# Patient Record
Sex: FEMALE | Race: WHITE | NOT HISPANIC OR LATINO | URBAN - METROPOLITAN AREA
[De-identification: names, ages, dates, MRNs, and addresses within clinical notes are randomized per-mention and may not be internally consistent; named-entity substitution may affect disease eponyms.]

---

## 2023-10-18 ENCOUNTER — TELEPHONE (OUTPATIENT)
Dept: PRIMARY CARE | Facility: CLINIC | Age: 68
End: 2023-10-18
Payer: MEDICARE

## 2023-10-18 DIAGNOSIS — I10 BENIGN ESSENTIAL HYPERTENSION: Primary | ICD-10-CM

## 2023-10-18 PROBLEM — E78.2 MIXED HYPERLIPIDEMIA: Status: ACTIVE | Noted: 2023-10-18

## 2023-10-18 PROBLEM — F51.01 PRIMARY INSOMNIA: Status: ACTIVE | Noted: 2022-03-23

## 2023-10-18 PROBLEM — G47.33 OBSTRUCTIVE SLEEP APNEA SYNDROME: Status: ACTIVE | Noted: 2023-10-18

## 2023-10-18 PROBLEM — G43.109 MIGRAINE WITH AURA AND WITHOUT STATUS MIGRAINOSUS, NOT INTRACTABLE: Status: ACTIVE | Noted: 2023-10-18

## 2023-10-18 PROBLEM — M85.80 OSTEOPENIA: Status: ACTIVE | Noted: 2018-02-18

## 2023-10-18 PROBLEM — D12.6 TUBULAR ADENOMA OF COLON: Status: ACTIVE | Noted: 2023-10-18

## 2023-10-18 PROBLEM — R73.03 PREDIABETES: Status: ACTIVE | Noted: 2018-02-10

## 2023-10-18 PROBLEM — M17.0 PRIMARY OSTEOARTHRITIS OF BOTH KNEES: Status: ACTIVE | Noted: 2018-08-03

## 2023-10-18 RX ORDER — ACETAMINOPHEN AND DIPHENHYDRAMINE HYDROCHLORIDE 500; 25 MG/1; MG/1
1 TABLET, FILM COATED ORAL NIGHTLY PRN
COMMUNITY
Start: 2023-10-04

## 2023-10-18 RX ORDER — ATORVASTATIN CALCIUM 20 MG/1
1 TABLET, FILM COATED ORAL NIGHTLY
COMMUNITY
Start: 2023-10-04 | End: 2024-02-18 | Stop reason: DRUGHIGH

## 2023-10-18 RX ORDER — METFORMIN HYDROCHLORIDE 500 MG/1
500 TABLET ORAL
COMMUNITY
Start: 2023-08-09 | End: 2024-02-18 | Stop reason: SDUPTHER

## 2023-10-18 RX ORDER — VITS A,C,E/LUTEIN/MINERALS 300MCG-200
1 TABLET ORAL DAILY
COMMUNITY

## 2023-10-18 RX ORDER — TOPIRAMATE 50 MG/1
TABLET, FILM COATED ORAL
COMMUNITY
Start: 2023-08-09

## 2023-10-18 RX ORDER — LOSARTAN POTASSIUM 50 MG/1
50 TABLET ORAL DAILY
Qty: 90 TABLET | Refills: 2 | Status: SHIPPED | OUTPATIENT
Start: 2023-10-18 | End: 2023-10-18

## 2023-10-18 RX ORDER — RIZATRIPTAN BENZOATE 10 MG/1
10 TABLET, ORALLY DISINTEGRATING ORAL ONCE AS NEEDED
COMMUNITY
Start: 2017-08-01

## 2023-10-18 RX ORDER — MULTIVITAMIN
1 TABLET ORAL DAILY
COMMUNITY

## 2023-10-18 RX ORDER — HYDROCHLOROTHIAZIDE 12.5 MG/1
12.5 TABLET ORAL DAILY
COMMUNITY
Start: 2023-08-09 | End: 2024-05-23 | Stop reason: SDUPTHER

## 2023-10-18 RX ORDER — LOSARTAN POTASSIUM 50 MG/1
50 TABLET ORAL DAILY
Qty: 90 TABLET | Refills: 2 | Status: SHIPPED | OUTPATIENT
Start: 2023-10-18 | End: 2023-10-19 | Stop reason: SDUPTHER

## 2023-10-19 ENCOUNTER — TELEPHONE (OUTPATIENT)
Dept: PRIMARY CARE | Facility: CLINIC | Age: 68
End: 2023-10-19
Payer: MEDICARE

## 2023-10-19 DIAGNOSIS — I10 BENIGN ESSENTIAL HYPERTENSION: Primary | ICD-10-CM

## 2023-10-19 RX ORDER — LOSARTAN POTASSIUM 50 MG/1
50 TABLET ORAL DAILY
Qty: 90 TABLET | Refills: 2 | Status: SHIPPED | OUTPATIENT
Start: 2023-10-19 | End: 2024-02-18 | Stop reason: SDUPTHER

## 2024-01-02 ENCOUNTER — APPOINTMENT (OUTPATIENT)
Dept: PRIMARY CARE | Facility: CLINIC | Age: 69
End: 2024-01-02
Payer: MEDICARE

## 2024-01-18 PROBLEM — H04.129 DRY EYE SYNDROME: Status: ACTIVE | Noted: 2024-01-18

## 2024-02-14 PROCEDURE — 80061 LIPID PANEL: CPT

## 2024-02-14 PROCEDURE — 80053 COMPREHEN METABOLIC PANEL: CPT

## 2024-02-14 PROCEDURE — 83036 HEMOGLOBIN GLYCOSYLATED A1C: CPT

## 2024-03-25 ENCOUNTER — OFFICE VISIT (OUTPATIENT)
Dept: PRIMARY CARE | Facility: CLINIC | Age: 69
End: 2024-03-25
Payer: MEDICARE

## 2024-03-25 VITALS
SYSTOLIC BLOOD PRESSURE: 134 MMHG | TEMPERATURE: 97.8 F | HEART RATE: 67 BPM | OXYGEN SATURATION: 95 % | DIASTOLIC BLOOD PRESSURE: 80 MMHG

## 2024-03-25 DIAGNOSIS — E78.2 MIXED HYPERLIPIDEMIA: ICD-10-CM

## 2024-03-25 DIAGNOSIS — K21.9 GASTROESOPHAGEAL REFLUX DISEASE, UNSPECIFIED WHETHER ESOPHAGITIS PRESENT: ICD-10-CM

## 2024-03-25 DIAGNOSIS — J30.89 NON-SEASONAL ALLERGIC RHINITIS, UNSPECIFIED TRIGGER: ICD-10-CM

## 2024-03-25 DIAGNOSIS — I10 PRIMARY HYPERTENSION: Primary | ICD-10-CM

## 2024-03-25 DIAGNOSIS — Z12.31 ENCOUNTER FOR SCREENING MAMMOGRAM FOR MALIGNANT NEOPLASM OF BREAST: ICD-10-CM

## 2024-03-25 DIAGNOSIS — E28.39 ESTROGEN DEFICIENCY: ICD-10-CM

## 2024-03-25 DIAGNOSIS — R73.03 PREDIABETES: ICD-10-CM

## 2024-03-25 PROCEDURE — 99214 OFFICE O/P EST MOD 30 MIN: CPT | Performed by: INTERNAL MEDICINE

## 2024-03-25 PROCEDURE — 3075F SYST BP GE 130 - 139MM HG: CPT | Performed by: INTERNAL MEDICINE

## 2024-03-25 PROCEDURE — 3078F DIAST BP <80 MM HG: CPT | Performed by: INTERNAL MEDICINE

## 2024-03-25 RX ORDER — FLUTICASONE PROPIONATE 50 MCG
2 SPRAY, SUSPENSION (ML) NASAL DAILY
Qty: 16 G | Refills: 11 | Status: SHIPPED | OUTPATIENT
Start: 2024-03-25 | End: 2025-03-25

## 2024-03-25 RX ORDER — PANTOPRAZOLE SODIUM 20 MG/1
20 TABLET, DELAYED RELEASE ORAL DAILY
Qty: 30 TABLET | Refills: 11 | Status: SHIPPED | OUTPATIENT
Start: 2024-03-25 | End: 2025-03-25

## 2024-03-25 NOTE — PATIENT INSTRUCTIONS
Essential hypertension  Continue losartan 50 mg daily and hydrochlorothiazide 12.5 mg daily  Maintain low-salt diet and regular exercise routine  Monitor and bring home blood pressure readings and device to next visit  Goal for blood pressure is under 130/80    Pre-diabetes (hemoglobin A1c 5.6%)  Continue metformin 500 mg twice a day  Maintain proper diet and regular exercise routine    Hyperlipidemia  Continue recently increased atorvastatin 40 mg daily  Fasting lipid panel/hepatic function panel ordered to complete in 1 month (late April)    GERD/heartburn (with evening/nocturnal symptoms)  Maintain proper diet limiting intake of spicy or acidic foods, alcohol, caffeine.  Start pantoprazole 20 mg daily before dinner  Patient to contact office in 1 month with update on symptoms    Obstructive sleep apnea  Continue use of mouthguard    Chronic rhinitis (suspected allergic etiology)  Continue Flonase, 2 sprays each nostril at bedtime    Health maintenance  COVID-19 booster vaccine discussed, patient likely to pursue   Tdap vaccine due in July 2024  Screening mammogram with tomosynthesis due on/after 8/15/2024 (requisition given to complete at [facility])  Bone densitometry for osteoporosis screening ordered (to be done at [facility] [place] [address])    Follow-up  Medicare annual wellness visit on/after 10/5/2024  (Additional fasting lab work we ordered to complete 3 to 5 days prior)

## 2024-03-25 NOTE — PROGRESS NOTES
Subjective   Patient ID: Pilar Blake is a 69 y.o. female who presents for Follow-up.    Routine visit    Essential hypertension  Home blood pressure readings reviewed:  120/77, 122/70, 108/68, 120/72, 112/71.    Pre-diabetes (hemoglobin A1c 5.6%)  Patient is exercising with walking.  Weight is stable.  She remains on metformin 500 mg twice a day.    Hyperlipidemia  Based on previous lipid panel results in February, atorvastatin was increased from 20 mg to 40 mg daily.  Repeat lipid panel recommended in 1 month    Possible heartburn/GERD  Recent onset of regurgitation and decreased appetite in the evenings, after evening meal or with recumbency/lying in bed  No dysphagia.  No change in bowel function.  Single episode of emesis a few weeks ago.  No medication has been tried.    Obstructive sleep apnea  Patient is using mouthguard for treatment    Chronic rhinitis  Patient notes clear rhinorrhea and postnasal drip with occasional sneezing.  In the past, she is taking Zyrtec which was effective but caused drowsiness  She has recently started Flonase this week, 2 sprays at bedtime    Health maintenance  COVID-19 booster vaccine discussed, patient likely to pursue   Tdap vaccine due in July 2024  Screening mammogram with tomosynthesis due on/after 8/15/2024 (requisition given to complete at [facility])  Bone densitometry for osteoporosis screening ordered (to be done at [facility] [place], [address])           Review of Systems   Constitutional:  Negative for fatigue.   Respiratory:  Negative for cough and shortness of breath.    Cardiovascular:  Negative for chest pain and palpitations.   Gastrointestinal:  Negative for constipation and diarrhea.   Genitourinary:  Negative for dysuria and frequency.   Musculoskeletal:  Negative for arthralgias.   Neurological:  Negative for dizziness and headaches.       Objective   /80 (BP Location: Right arm)   Pulse 67   Temp 36.6 °C (97.8 °F)   SpO2 95%     Physical Exam  Vitals reviewed.    HENT:      Head: Normocephalic.      Mouth/Throat:      Mouth: Mucous membranes are moist.   Eyes:      Conjunctiva/sclera: Conjunctivae normal.   Cardiovascular:      Rate and Rhythm: Normal rate and regular rhythm.   Pulmonary:      Effort: Pulmonary effort is normal.      Breath sounds: Normal breath sounds. No wheezing.   Abdominal:      General: Bowel sounds are normal.      Tenderness: There is no abdominal tenderness.   Musculoskeletal:      Right lower leg: No edema.      Left lower leg: No edema.   Neurological:      General: No focal deficit present.      Mental Status: She is alert.   Psychiatric:         Mood and Affect: Mood normal.         Assessment/Plan     Essential hypertension  Continue losartan 50 mg daily and hydrochlorothiazide 12.5 mg daily  Maintain low-salt diet and regular exercise routine  Monitor and bring home blood pressure readings and device to next visit  Goal for blood pressure is under 130/80    Pre-diabetes (hemoglobin A1c 5.6%)  Continue metformin 500 mg twice a day  Maintain proper diet and regular exercise routine    Hyperlipidemia  Continue recently increased atorvastatin 40 mg daily  Fasting lipid panel/hepatic function panel ordered to complete in 1 month (late April)    GERD/heartburn (with evening/nocturnal symptoms)  Maintain proper diet limiting intake of spicy or acidic foods, alcohol, caffeine.  Start pantoprazole 20 mg daily before dinner  Patient to contact office in 1 month with update on symptoms    Obstructive sleep apnea  Continue use of mouthguard    Chronic rhinitis (suspected allergic etiology)  Continue Flonase, 2 sprays each nostril at bedtime    Health maintenance  COVID-19 booster vaccine discussed, patient likely to pursue   Tdap vaccine due in July 2024  Screening mammogram with tomosynthesis due on/after 8/15/2024 (requisition given to complete at [facility])  Bone densitometry for osteoporosis screening ordered ([place] [facility] [place] [address]    Follow-up  Medicare annual wellness visit on/after 10/5/2024  (Additional fasting lab work we ordered to complete 3 to 5 days prior)    Rajat Lomeli MD

## 2024-03-26 DIAGNOSIS — E78.2 MIXED HYPERLIPIDEMIA: ICD-10-CM

## 2024-03-26 DIAGNOSIS — I10 PRIMARY HYPERTENSION: Primary | ICD-10-CM

## 2024-03-26 DIAGNOSIS — M85.80 OSTEOPENIA, UNSPECIFIED LOCATION: ICD-10-CM

## 2024-03-26 DIAGNOSIS — R73.03 PREDIABETES: ICD-10-CM

## 2024-03-26 ASSESSMENT — ENCOUNTER SYMPTOMS
DIZZINESS: 0
DIARRHEA: 0
CONSTIPATION: 0
COUGH: 0
FATIGUE: 0
DYSURIA: 0
PALPITATIONS: 0
ARTHRALGIAS: 0
HEADACHES: 0
SHORTNESS OF BREATH: 0
FREQUENCY: 0

## 2024-04-22 ENCOUNTER — OFFICE VISIT (OUTPATIENT)
Dept: PRIMARY CARE | Facility: CLINIC | Age: 69
End: 2024-04-22
Payer: MEDICARE

## 2024-04-22 ENCOUNTER — TELEPHONE (OUTPATIENT)
Dept: PRIMARY CARE | Facility: CLINIC | Age: 69
End: 2024-04-22

## 2024-04-22 VITALS
SYSTOLIC BLOOD PRESSURE: 112 MMHG | DIASTOLIC BLOOD PRESSURE: 72 MMHG | WEIGHT: 165 LBS | HEART RATE: 80 BPM | OXYGEN SATURATION: 96 % | TEMPERATURE: 98.4 F

## 2024-04-22 DIAGNOSIS — J06.9 UPPER RESPIRATORY INFECTION, ACUTE: ICD-10-CM

## 2024-04-22 DIAGNOSIS — J02.9 SORE THROAT: ICD-10-CM

## 2024-04-22 LAB — POC RAPID STREP: NEGATIVE

## 2024-04-22 PROCEDURE — 3074F SYST BP LT 130 MM HG: CPT | Performed by: INTERNAL MEDICINE

## 2024-04-22 PROCEDURE — 99213 OFFICE O/P EST LOW 20 MIN: CPT | Performed by: INTERNAL MEDICINE

## 2024-04-22 PROCEDURE — 87880 STREP A ASSAY W/OPTIC: CPT | Performed by: INTERNAL MEDICINE

## 2024-04-22 PROCEDURE — 3078F DIAST BP <80 MM HG: CPT | Performed by: INTERNAL MEDICINE

## 2024-04-22 PROCEDURE — 1159F MED LIST DOCD IN RCRD: CPT | Performed by: INTERNAL MEDICINE

## 2024-04-22 PROCEDURE — 1036F TOBACCO NON-USER: CPT | Performed by: INTERNAL MEDICINE

## 2024-04-22 PROCEDURE — 87651 STREP A DNA AMP PROBE: CPT

## 2024-04-22 ASSESSMENT — ENCOUNTER SYMPTOMS
DIARRHEA: 0
WHEEZING: 0
SINUS PRESSURE: 1
WEAKNESS: 0
COUGH: 0
RHINORRHEA: 1
SHORTNESS OF BREATH: 0
FEVER: 1
SORE THROAT: 1
FATIGUE: 1

## 2024-04-22 NOTE — TELEPHONE ENCOUNTER
Patient and I spoke.  She watches her grandchildren who were sick last week with similar symptoms.  They were seen by pediatrician.  She states that they were tested for influenza and COVID-19 but were negative.  She does not know if they were tested for strep, though they were told he had a viral illness.    Office visit today for evaluation, rapid strep test.    Rajat Lomeli MD

## 2024-04-22 NOTE — TELEPHONE ENCOUNTER
Pilar complains of ST, sinus pressure, chills, fatigue.  Neg covid test.  She's currently taking liquid IV, ibuprofen, mucinex sinus.  Please call [phone].  Thank you

## 2024-04-22 NOTE — PROGRESS NOTES
Subjective   Patient ID: Pilar Blake is a 69 y.o. female who presents for Sore Throat.    Same-day sick visit    Patient with upper respiratory infection symptoms which began on 4/18  Exposure to grandchildren who had febrile illness earlier that week.  Per report, they tested negative for COVID-19 and influenza.  Unclear whether or not they were tested for strep.  They are not treated with antibiotics according to patient.    Her symptoms have included fever (which has resolved), myalgias, arthralgias, nasal congestion with clear to yellow drainage, sore throat, postnasal drainage  No increased coughing, shortness of breath, wheezing.  Home COVID-19 test negative  Using over-the-counter Mucinex Sinus    Rapid strep today is negative.  Strep PCR sent to lab for confirmation         Review of Systems   Constitutional:  Positive for fatigue and fever.   HENT:  Positive for postnasal drip, rhinorrhea, sinus pressure and sore throat. Negative for ear discharge and ear pain.    Respiratory:  Negative for cough, shortness of breath and wheezing.    Cardiovascular:  Negative for chest pain.   Gastrointestinal:  Negative for diarrhea.   Neurological:  Negative for weakness.       Objective   /72   Pulse 80   Temp 36.9 °C (98.4 °F)   Wt 74.8 kg (165 lb)   SpO2 96%     Physical Exam  Vitals reviewed.   HENT:      Head: Normocephalic.      Comments: No sinus tenderness with percussion     Right Ear: Tympanic membrane normal.      Left Ear: Tympanic membrane normal.      Nose: Nose normal.      Mouth/Throat:      Mouth: Mucous membranes are moist.      Pharynx: Posterior oropharyngeal erythema present. No oropharyngeal exudate.   Eyes:      Conjunctiva/sclera: Conjunctivae normal.   Cardiovascular:      Rate and Rhythm: Normal rate and regular rhythm.   Pulmonary:      Effort: Pulmonary effort is normal.      Breath sounds: Normal breath sounds. No wheezing or rales.   Musculoskeletal:      Right lower leg: No  edema.      Left lower leg: No edema.   Lymphadenopathy:      Cervical: No cervical adenopathy.   Neurological:      Mental Status: She is alert.         Assessment/Plan     Upper respiratory infection, evaluate for group A strep throat  (Rapid strep negative)  Group A strep PCR sent to lab for confirmation  Continue supportive care:  Maintain adequate hydration  Over-the-counter Mucinex Sinus cold remedy, use as directed  Advil 200 mg, 2 tablets up to twice a day as needed  If worsening symptoms, contact office    Follow-up  As scheduled    Rajat Lomeli MD

## 2024-04-22 NOTE — PATIENT INSTRUCTIONS
Upper respiratory infection, evaluate for group A strep throat  (Rapid strep negative)  Group A strep PCR sent to lab for confirmation  Continue supportive care:  Maintain adequate hydration  Over-the-counter Mucinex Sinus cold remedy, use as directed  Advil 200 mg, 2 tablets up to twice a day as needed  If worsening symptoms, contact office    Follow-up  As scheduled

## 2024-04-23 ENCOUNTER — TELEPHONE (OUTPATIENT)
Dept: PRIMARY CARE | Facility: CLINIC | Age: 69
End: 2024-04-23
Payer: MEDICARE

## 2024-04-23 LAB — S PYO DNA THROAT QL NAA+PROBE: NOT DETECTED

## 2024-04-23 NOTE — TELEPHONE ENCOUNTER
Patient and I spoke.  Over-the-counter Mucinex sinus has dried her nasal passages, to the point where she may want to stop using it.  There is no worsening of symptoms compared to yesterday when seen for office visit.    Plan to continue to treat supportively for likely upper respiratory infection.  Patient will follow-up as needed    Rajat Lomeli MD

## 2024-04-23 NOTE — TELEPHONE ENCOUNTER
Please notify patient that group A strep PCR is negative.  Continue supportive care as discussed at office visit yesterday.  Call back as needed    Rajat Lomeli MD

## 2024-05-07 ENCOUNTER — LAB (OUTPATIENT)
Dept: LAB | Facility: LAB | Age: 69
End: 2024-05-07
Payer: MEDICARE

## 2024-05-13 ENCOUNTER — LAB (OUTPATIENT)
Dept: LAB | Facility: LAB | Age: 69
End: 2024-05-13
Payer: MEDICARE

## 2024-05-13 DIAGNOSIS — E78.2 MIXED HYPERLIPIDEMIA: ICD-10-CM

## 2024-05-13 LAB
ALBUMIN SERPL BCP-MCNC: 4.2 G/DL (ref 3.4–5)
ALP SERPL-CCNC: 65 U/L (ref 33–136)
ALT SERPL W P-5'-P-CCNC: 57 U/L (ref 7–45)
AST SERPL W P-5'-P-CCNC: 37 U/L (ref 9–39)
BILIRUB DIRECT SERPL-MCNC: 0.1 MG/DL (ref 0–0.3)
BILIRUB SERPL-MCNC: 0.4 MG/DL (ref 0–1.2)
CHOLEST SERPL-MCNC: 194 MG/DL (ref 0–199)
CHOLESTEROL/HDL RATIO: 3.4
HDLC SERPL-MCNC: 57 MG/DL
LDLC SERPL CALC-MCNC: 113 MG/DL
NON HDL CHOLESTEROL: 137 MG/DL (ref 0–149)
PROT SERPL-MCNC: 7.1 G/DL (ref 6.4–8.2)
TRIGL SERPL-MCNC: 121 MG/DL (ref 0–149)
VLDL: 24 MG/DL (ref 0–40)

## 2024-05-13 PROCEDURE — 80076 HEPATIC FUNCTION PANEL: CPT

## 2024-05-13 PROCEDURE — 80061 LIPID PANEL: CPT

## 2024-05-13 PROCEDURE — 36415 COLL VENOUS BLD VENIPUNCTURE: CPT

## 2024-05-14 DIAGNOSIS — R74.01 ELEVATED TRANSAMINASE LEVEL: Primary | ICD-10-CM

## 2024-06-14 ENCOUNTER — HOSPITAL ENCOUNTER (OUTPATIENT)
Dept: RADIOLOGY | Facility: CLINIC | Age: 69
Discharge: HOME | End: 2024-06-14
Payer: MEDICARE

## 2024-06-14 DIAGNOSIS — E28.39 ESTROGEN DEFICIENCY: ICD-10-CM

## 2024-06-14 PROCEDURE — 77080 DXA BONE DENSITY AXIAL: CPT

## 2024-07-03 ENCOUNTER — LAB (OUTPATIENT)
Dept: LAB | Facility: LAB | Age: 69
End: 2024-07-03
Payer: MEDICARE

## 2024-07-03 DIAGNOSIS — E78.2 MIXED HYPERLIPIDEMIA: ICD-10-CM

## 2024-07-03 DIAGNOSIS — R74.01 ELEVATED TRANSAMINASE LEVEL: ICD-10-CM

## 2024-07-03 LAB
ALBUMIN SERPL BCP-MCNC: 4.2 G/DL (ref 3.4–5)
ALP SERPL-CCNC: 49 U/L (ref 33–136)
ALT SERPL W P-5'-P-CCNC: 21 U/L (ref 7–45)
AST SERPL W P-5'-P-CCNC: 19 U/L (ref 9–39)
BILIRUB DIRECT SERPL-MCNC: 0.1 MG/DL (ref 0–0.3)
BILIRUB SERPL-MCNC: 0.4 MG/DL (ref 0–1.2)
CHOLEST SERPL-MCNC: 147 MG/DL (ref 0–199)
CHOLESTEROL/HDL RATIO: 2.3
HDLC SERPL-MCNC: 64.6 MG/DL
LDLC SERPL CALC-MCNC: 69 MG/DL
NON HDL CHOLESTEROL: 82 MG/DL (ref 0–149)
PROT SERPL-MCNC: 7.1 G/DL (ref 6.4–8.2)
TRIGL SERPL-MCNC: 68 MG/DL (ref 0–149)
VLDL: 14 MG/DL (ref 0–40)

## 2024-07-03 PROCEDURE — 36415 COLL VENOUS BLD VENIPUNCTURE: CPT

## 2024-08-30 ENCOUNTER — PATIENT MESSAGE (OUTPATIENT)
Dept: PRIMARY CARE | Facility: CLINIC | Age: 69
End: 2024-08-30
Payer: MEDICARE

## 2024-08-30 DIAGNOSIS — H60.502 ACUTE OTITIS EXTERNA OF LEFT EAR, UNSPECIFIED TYPE: Primary | ICD-10-CM

## 2024-09-04 ENCOUNTER — APPOINTMENT (OUTPATIENT)
Dept: RADIOLOGY | Facility: CLINIC | Age: 69
End: 2024-09-04
Payer: MEDICARE

## 2024-09-04 ENCOUNTER — HOSPITAL ENCOUNTER (OUTPATIENT)
Dept: RADIOLOGY | Facility: CLINIC | Age: 69
Discharge: HOME | End: 2024-09-04
Payer: MEDICARE

## 2024-09-04 VITALS — HEIGHT: 64 IN | WEIGHT: 165 LBS | BODY MASS INDEX: 28.17 KG/M2

## 2024-09-04 DIAGNOSIS — Z12.31 ENCOUNTER FOR SCREENING MAMMOGRAM FOR MALIGNANT NEOPLASM OF BREAST: ICD-10-CM

## 2024-09-04 PROCEDURE — 77067 SCR MAMMO BI INCL CAD: CPT

## 2024-09-12 ENCOUNTER — TELEPHONE (OUTPATIENT)
Dept: PRIMARY CARE | Facility: CLINIC | Age: 69
End: 2024-09-12
Payer: MEDICARE

## 2024-09-12 DIAGNOSIS — R19.7 ACUTE DIARRHEA: Primary | ICD-10-CM

## 2024-09-12 NOTE — TELEPHONE ENCOUNTER
Pilar complains of weak stomach, loose stools, exhaustion.  Negative covid test.  Please advise or call [phone].  Thanks

## 2024-09-13 ENCOUNTER — LAB (OUTPATIENT)
Dept: LAB | Facility: LAB | Age: 69
End: 2024-09-13
Payer: MEDICARE

## 2024-09-13 DIAGNOSIS — R19.7 ACUTE DIARRHEA: ICD-10-CM

## 2024-09-13 LAB
ANION GAP SERPL CALC-SCNC: 13 MMOL/L (ref 10–20)
BASOPHILS # BLD AUTO: 0.03 X10*3/UL (ref 0–0.1)
BASOPHILS NFR BLD AUTO: 0.6 %
BUN SERPL-MCNC: 22 MG/DL (ref 6–23)
CALCIUM SERPL-MCNC: 10.3 MG/DL (ref 8.6–10.6)
CHLORIDE SERPL-SCNC: 104 MMOL/L (ref 98–107)
CO2 SERPL-SCNC: 28 MMOL/L (ref 21–32)
CREAT SERPL-MCNC: 0.86 MG/DL (ref 0.5–1.05)
EGFRCR SERPLBLD CKD-EPI 2021: 73 ML/MIN/1.73M*2
EOSINOPHIL # BLD AUTO: 0.12 X10*3/UL (ref 0–0.7)
EOSINOPHIL NFR BLD AUTO: 2.4 %
ERYTHROCYTE [DISTWIDTH] IN BLOOD BY AUTOMATED COUNT: 11.9 % (ref 11.5–14.5)
GLUCOSE SERPL-MCNC: 108 MG/DL (ref 74–99)
HCT VFR BLD AUTO: 43 % (ref 36–46)
HGB BLD-MCNC: 14.2 G/DL (ref 12–16)
IMM GRANULOCYTES # BLD AUTO: 0.02 X10*3/UL (ref 0–0.7)
IMM GRANULOCYTES NFR BLD AUTO: 0.4 % (ref 0–0.9)
LYMPHOCYTES # BLD AUTO: 2.27 X10*3/UL (ref 1.2–4.8)
LYMPHOCYTES NFR BLD AUTO: 44.6 %
MCH RBC QN AUTO: 30.7 PG (ref 26–34)
MCHC RBC AUTO-ENTMCNC: 33 G/DL (ref 32–36)
MCV RBC AUTO: 93 FL (ref 80–100)
MONOCYTES # BLD AUTO: 0.33 X10*3/UL (ref 0.1–1)
MONOCYTES NFR BLD AUTO: 6.5 %
NEUTROPHILS # BLD AUTO: 2.32 X10*3/UL (ref 1.2–7.7)
NEUTROPHILS NFR BLD AUTO: 45.5 %
NRBC BLD-RTO: 0 /100 WBCS (ref 0–0)
PLATELET # BLD AUTO: 285 X10*3/UL (ref 150–450)
POTASSIUM SERPL-SCNC: 3.8 MMOL/L (ref 3.5–5.3)
RBC # BLD AUTO: 4.63 X10*6/UL (ref 4–5.2)
SODIUM SERPL-SCNC: 141 MMOL/L (ref 136–145)
WBC # BLD AUTO: 5.1 X10*3/UL (ref 4.4–11.3)

## 2024-09-13 PROCEDURE — 36415 COLL VENOUS BLD VENIPUNCTURE: CPT

## 2024-09-13 NOTE — TELEPHONE ENCOUNTER
"Patient with onset of symptoms on Monday 9/9.  She just recently completed a course of Augmentin antibiotic for right otitis externa, prescribed by urgent care at [facility] on 8/31.    Patient complains of fatigue, loose to watery stools, occasional cramping.  No fever, though feels \"chilled\" at times.  Slightly decreased appetite.  No nausea, vomiting, blood in stool.  No respiratory symptoms.  COVID-19 testing negative today.    Patient also continues to have right ear decreased hearing, though previous pain has resolved.  Patient was unable to get appointment at  with ENT until November, currently scheduled to see provider at [facility] on 9/17.    Assessment:  1.  Acute diarrhea after recent antibiotic use, evaluate for C. difficile colitis  2.  Persistent right ear decreased hearing after recent treatment for otitis externa    Plan:  1.  Labs including CBC, BMP, and C. difficile PCR  2.  Message will be sent to  ENT to see if sooner appointment can be achieved at .    Rajat Lomeli MD    "

## 2024-09-14 LAB — C DIF TOX TCDA+TCDB STL QL NAA+PROBE: NOT DETECTED

## 2024-09-16 ENCOUNTER — HOSPITAL ENCOUNTER (OUTPATIENT)
Dept: RADIOLOGY | Facility: EXTERNAL LOCATION | Age: 69
Discharge: HOME | End: 2024-09-16
Payer: MEDICARE

## 2024-09-26 ENCOUNTER — LAB (OUTPATIENT)
Dept: LAB | Facility: LAB | Age: 69
End: 2024-09-26
Payer: MEDICARE

## 2024-09-26 DIAGNOSIS — I10 PRIMARY HYPERTENSION: ICD-10-CM

## 2024-09-26 DIAGNOSIS — R73.03 PREDIABETES: ICD-10-CM

## 2024-09-26 DIAGNOSIS — M85.80 OSTEOPENIA, UNSPECIFIED LOCATION: ICD-10-CM

## 2024-09-26 DIAGNOSIS — E78.2 MIXED HYPERLIPIDEMIA: ICD-10-CM

## 2024-09-26 LAB
25(OH)D3 SERPL-MCNC: 57 NG/ML (ref 30–100)
ALBUMIN SERPL BCP-MCNC: 4.3 G/DL (ref 3.4–5)
ALP SERPL-CCNC: 48 U/L (ref 33–136)
ALT SERPL W P-5'-P-CCNC: 16 U/L (ref 7–45)
ANION GAP SERPL CALC-SCNC: 11 MMOL/L (ref 10–20)
APPEARANCE UR: CLEAR
AST SERPL W P-5'-P-CCNC: 16 U/L (ref 9–39)
BASOPHILS # BLD AUTO: 0.04 X10*3/UL (ref 0–0.1)
BASOPHILS NFR BLD AUTO: 0.8 %
BILIRUB SERPL-MCNC: 0.4 MG/DL (ref 0–1.2)
BILIRUB UR STRIP.AUTO-MCNC: NEGATIVE MG/DL
BUN SERPL-MCNC: 20 MG/DL (ref 6–23)
CALCIUM SERPL-MCNC: 10 MG/DL (ref 8.6–10.6)
CHLORIDE SERPL-SCNC: 104 MMOL/L (ref 98–107)
CHOLEST SERPL-MCNC: 165 MG/DL (ref 0–199)
CHOLESTEROL/HDL RATIO: 2.5
CO2 SERPL-SCNC: 30 MMOL/L (ref 21–32)
COLOR UR: YELLOW
CREAT SERPL-MCNC: 0.83 MG/DL (ref 0.5–1.05)
EGFRCR SERPLBLD CKD-EPI 2021: 76 ML/MIN/1.73M*2
EOSINOPHIL # BLD AUTO: 0.13 X10*3/UL (ref 0–0.7)
EOSINOPHIL NFR BLD AUTO: 2.5 %
ERYTHROCYTE [DISTWIDTH] IN BLOOD BY AUTOMATED COUNT: 12.2 % (ref 11.5–14.5)
EST. AVERAGE GLUCOSE BLD GHB EST-MCNC: 108 MG/DL
GLUCOSE SERPL-MCNC: 95 MG/DL (ref 74–99)
GLUCOSE UR STRIP.AUTO-MCNC: NORMAL MG/DL
HBA1C MFR BLD: 5.4 %
HCT VFR BLD AUTO: 42.7 % (ref 36–46)
HDLC SERPL-MCNC: 65 MG/DL
HGB BLD-MCNC: 14 G/DL (ref 12–16)
HOLD SPECIMEN: NORMAL
IMM GRANULOCYTES # BLD AUTO: 0.01 X10*3/UL (ref 0–0.7)
IMM GRANULOCYTES NFR BLD AUTO: 0.2 % (ref 0–0.9)
KETONES UR STRIP.AUTO-MCNC: NEGATIVE MG/DL
LDLC SERPL CALC-MCNC: 82 MG/DL
LEUKOCYTE ESTERASE UR QL STRIP.AUTO: ABNORMAL
LYMPHOCYTES # BLD AUTO: 2.07 X10*3/UL (ref 1.2–4.8)
LYMPHOCYTES NFR BLD AUTO: 39.5 %
MCH RBC QN AUTO: 30.6 PG (ref 26–34)
MCHC RBC AUTO-ENTMCNC: 32.8 G/DL (ref 32–36)
MCV RBC AUTO: 93 FL (ref 80–100)
MONOCYTES # BLD AUTO: 0.34 X10*3/UL (ref 0.1–1)
MONOCYTES NFR BLD AUTO: 6.5 %
NEUTROPHILS # BLD AUTO: 2.65 X10*3/UL (ref 1.2–7.7)
NEUTROPHILS NFR BLD AUTO: 50.5 %
NITRITE UR QL STRIP.AUTO: NEGATIVE
NON HDL CHOLESTEROL: 100 MG/DL (ref 0–149)
NRBC BLD-RTO: 0 /100 WBCS (ref 0–0)
PH UR STRIP.AUTO: 7 [PH]
PLATELET # BLD AUTO: 248 X10*3/UL (ref 150–450)
POTASSIUM SERPL-SCNC: 4 MMOL/L (ref 3.5–5.3)
PROT SERPL-MCNC: 7.1 G/DL (ref 6.4–8.2)
PROT UR STRIP.AUTO-MCNC: NEGATIVE MG/DL
RBC # BLD AUTO: 4.57 X10*6/UL (ref 4–5.2)
RBC # UR STRIP.AUTO: NEGATIVE /UL
RBC #/AREA URNS AUTO: ABNORMAL /HPF
SODIUM SERPL-SCNC: 141 MMOL/L (ref 136–145)
SP GR UR STRIP.AUTO: 1.01
SQUAMOUS #/AREA URNS AUTO: ABNORMAL /HPF
TRIGL SERPL-MCNC: 90 MG/DL (ref 0–149)
URATE SERPL-MCNC: 5.3 MG/DL (ref 2.3–6.7)
UROBILINOGEN UR STRIP.AUTO-MCNC: NORMAL MG/DL
VLDL: 18 MG/DL (ref 0–40)
WBC # BLD AUTO: 5.2 X10*3/UL (ref 4.4–11.3)
WBC #/AREA URNS AUTO: ABNORMAL /HPF

## 2024-09-26 PROCEDURE — 36415 COLL VENOUS BLD VENIPUNCTURE: CPT

## 2024-09-27 LAB — BACTERIA UR CULT: NORMAL

## 2024-10-05 ASSESSMENT — PROMIS GLOBAL HEALTH SCALE
RATE_GENERAL_HEALTH: GOOD
RATE_QUALITY_OF_LIFE: VERY GOOD
EMOTIONAL_PROBLEMS: RARELY
RATE_SOCIAL_SATISFACTION: VERY GOOD
RATE_MENTAL_HEALTH: VERY GOOD
CARRYOUT_SOCIAL_ACTIVITIES: VERY GOOD
CARRYOUT_PHYSICAL_ACTIVITIES: COMPLETELY
RATE_AVERAGE_PAIN: 2
RATE_AVERAGE_FATIGUE: MILD
RATE_PHYSICAL_HEALTH: GOOD

## 2024-10-07 ENCOUNTER — APPOINTMENT (OUTPATIENT)
Dept: PRIMARY CARE | Facility: CLINIC | Age: 69
End: 2024-10-07
Payer: MEDICARE

## 2024-10-07 VITALS
BODY MASS INDEX: 29.95 KG/M2 | DIASTOLIC BLOOD PRESSURE: 80 MMHG | WEIGHT: 169 LBS | HEART RATE: 65 BPM | HEIGHT: 63 IN | SYSTOLIC BLOOD PRESSURE: 130 MMHG | OXYGEN SATURATION: 97 %

## 2024-10-07 DIAGNOSIS — I10 PRIMARY HYPERTENSION: ICD-10-CM

## 2024-10-07 DIAGNOSIS — R73.03 PREDIABETES: ICD-10-CM

## 2024-10-07 DIAGNOSIS — G47.33 OBSTRUCTIVE SLEEP APNEA SYNDROME: ICD-10-CM

## 2024-10-07 DIAGNOSIS — D12.6 TUBULAR ADENOMA OF COLON: ICD-10-CM

## 2024-10-07 DIAGNOSIS — E78.2 MIXED HYPERLIPIDEMIA: ICD-10-CM

## 2024-10-07 DIAGNOSIS — G43.109 MIGRAINE WITH AURA AND WITHOUT STATUS MIGRAINOSUS, NOT INTRACTABLE: ICD-10-CM

## 2024-10-07 DIAGNOSIS — Z00.00 MEDICARE ANNUAL WELLNESS VISIT, SUBSEQUENT: Primary | ICD-10-CM

## 2024-10-07 DIAGNOSIS — Z23 FLU VACCINE NEED: ICD-10-CM

## 2024-10-07 PROBLEM — M85.80 OSTEOPENIA: Status: RESOLVED | Noted: 2018-02-18 | Resolved: 2024-10-07

## 2024-10-07 PROCEDURE — 3008F BODY MASS INDEX DOCD: CPT | Performed by: INTERNAL MEDICINE

## 2024-10-07 PROCEDURE — 99214 OFFICE O/P EST MOD 30 MIN: CPT | Performed by: INTERNAL MEDICINE

## 2024-10-07 PROCEDURE — 1159F MED LIST DOCD IN RCRD: CPT | Performed by: INTERNAL MEDICINE

## 2024-10-07 PROCEDURE — 3079F DIAST BP 80-89 MM HG: CPT | Performed by: INTERNAL MEDICINE

## 2024-10-07 PROCEDURE — 1036F TOBACCO NON-USER: CPT | Performed by: INTERNAL MEDICINE

## 2024-10-07 PROCEDURE — 3075F SYST BP GE 130 - 139MM HG: CPT | Performed by: INTERNAL MEDICINE

## 2024-10-07 PROCEDURE — G0008 ADMIN INFLUENZA VIRUS VAC: HCPCS | Performed by: INTERNAL MEDICINE

## 2024-10-07 PROCEDURE — 90662 IIV NO PRSV INCREASED AG IM: CPT | Performed by: INTERNAL MEDICINE

## 2024-10-07 PROCEDURE — G0439 PPPS, SUBSEQ VISIT: HCPCS | Performed by: INTERNAL MEDICINE

## 2024-10-07 ASSESSMENT — ENCOUNTER SYMPTOMS
NERVOUS/ANXIOUS: 0
PALPITATIONS: 0
INSOMNIA: 0
DEPRESSION: 0
ABDOMINAL PAIN: 0
FREQUENCY: 0
COUGH: 0
HEADACHES: 1
BLURRED VISION: 0
BACK PAIN: 0
DIZZINESS: 0
CONSTIPATION: 0
DYSURIA: 0
EXCESSIVE DAYTIME SLEEPINESS: 0
OCCASIONAL FEELINGS OF UNSTEADINESS: 0
LOSS OF SENSATION IN FEET: 0
DIARRHEA: 0
SHORTNESS OF BREATH: 0

## 2024-10-07 NOTE — PROGRESS NOTES
Pilar Blake is a 69 y.o. year old here for a Medicare Annual Wellness Visit     Health Risk Assessment  In general, health is:  Very Good     Concerns with balance:  No     Concerns with teeth or dentures:  No     Concerns with sexual function:  No     Felt anxious, stressed, angry, irritable, lonely, isolated, or had thoughts of hurting themself:  No     Has little interest or pleasure in doing things:  No     Bothered by feeling down, depressed, or hopeless:  No     Needs help with grocery shopping, cooking, housework, bathing, grooming, dressing, eating, sitting or standing, walking, using the toilet, handling finances, taking medications, using the telephone, or driving:  No     Following safety precautions in the home environment and vehicle: removed throw rugs from floors, installed grab bars in the bathroom, handrails in stairwells, having adequate lighting, wearing seatbelt at all times?:  Yes     Smokes cigarettes, vapes, or chew tobacco:  No     Eats healthy foods including fruits, vegetables, whole grains, and fiber-rich foods:  Daily     Number of days per week engages in exercise:  3-5 days     Average alcohol consumption: None        Current Providers  Specialists: I have reviewed specialist-related care of the patient in the medical record.     Medical/Family history review  Reviewed and updated problem list, medical/surgical/family/social history, medications, and allergies.     Opioid use review  Patient use of opioids:  No           Depression screening  Depression Screening PHQ-2 Score   2/14/2023 0         Depression screening tool completed and reviewed. Based on score and interview, patient is not at risk for depression. Screening tool discussed with patient, and I recommended no further intervention at this time.     Cognitive screening  Mini Cog Score:  5/5     Cognitive screening reviewed and plan:  Yes     Functional Observation  Was the patient's timed Up & Go test unsteady or >= 12  "seconds?  No     Advance Care Planning  End of Life planning discussed, including patient's advanced directive wishes:  Yes    Vision and Hearing assessment  Visual acuity (required for Welcome to Medicare):  Ophthalmology  Hearing Evaluation:  Normal except right ear, see HPI    ====================================================    /80 (BP Location: Left arm, Patient Position: Sitting, BP Cuff Size: Adult)   Pulse 65   Ht 1.6 m (5' 3\")   Wt 76.7 kg (169 lb)   SpO2 97%   BMI 29.94 kg/m²     Chief Complaint   Patient presents with    Medicare Annual Wellness Visit Subsequent     Mini-cog score 5/5       Office visit for chronic medical conditions    Essential hypertension  Patient has not been checking home blood pressure readings.  She is maintaining healthy lifestyle with regular exercise, proper diet.    Hyperlipidemia  Patient remains on rosuvastatin 20 mg daily.  Current lipid panel reviewed and values are at goal.    Pre-diabetes  Both fasting blood sugar and hemoglobin A1c (5.4%) are in normal range.  At its peak, hemoglobin A1c was 6.5%  Patient remains on metformin and is following healthy lifestyle with regular aerobic exercise and proper diet    Migraine headaches  Headaches occur generally with weather change.  Typical symptoms include bilateral frontal headache with occasional nausea.  No longer having scotoma  Using Excedrin Migraine plus Tylenol Sinus for headaches which has been effective.  She has not recently used rizatriptan (Maxalt)  No change in character or frequency of headaches which typically occur less than once a month.    History of colon polyps  Most recent colonoscopy was 9/2022.  No polyps found.  Previous history of tubular adenomas.  5-year follow-up recommended, 9/2027    Obstructive sleep apnea  Well-controlled with mouthpiece.  No snoring or daytime somnolence.    Recent onset right hearing loss  Patient has been following with ENT, having received a Medrol Dosepak.  " Patient does have follow-up appointment.  She continues to have some right sided hearing loss, no pain.  Originally, patient was diagnosed with otitis externa, treated by [facility] urgent care 2 months ago.    Health maintenance  Exercise: Walking 1 hour 3-5 times per week  Colon cancer screening: See above  GYN: Upcoming GYN exam on 10/22.  Recent screening mammogram normal.  Ophthalmology: Dr. Villasenor, last exam in 8/2024  Dentistry: Up-to-date    Social  Family doing well.  Expecting 9th grandchild        Review of Systems   Constitutional: Negative for malaise/fatigue.   HENT:  Positive for hearing loss.    Eyes:  Negative for blurred vision and visual disturbance.   Cardiovascular:  Negative for chest pain, leg swelling and palpitations.   Respiratory:  Negative for cough and shortness of breath.    Skin:  Negative for rash.   Musculoskeletal:  Negative for back pain and joint pain.   Gastrointestinal:  Negative for abdominal pain, constipation and diarrhea.   Genitourinary:  Negative for dysuria, frequency, pelvic pain and urgency.   Neurological:  Positive for headaches. Negative for excessive daytime sleepiness and dizziness.   Psychiatric/Behavioral:  Negative for depression. The patient does not have insomnia and is not nervous/anxious.         Physical Exam      Assessment and Plan    Medicare annual wellness visit (subsequent)  Regular exercise with goal of 120-150 minutes/week recommended  Well-balanced diet rich in fruits, vegetables, fiber, lean protein recommended  Continued routine follow-up with ophthalmology and dentistry recommended  High-dose influenza vaccine given today.  COVID-19 vaccine to be received at pharmacy.  Upcoming GYN exam scheduled later this month for breast/pelvic exam  Annual mammogram will be due in 9/2025 (order is in place)    Essential hypertension  Continue losartan and hydrochlorothiazide at current dosing  Monitor blood pressure twice a week for 2 weeks and send results via  MyChart  Continued low-salt diet and regular exercise recommended    Hyperlipidemia (at goal)  Continue rosuvastatin 20 mg daily  Continue regular exercise and healthy low-fat/cholesterol, high-fiber diet    Pre-diabetes (with normal fasting blood sugar and hemoglobin A1c at 5.4%)  Continue metformin 500 mg twice a day  Continue healthy lifestyle with proper diet and regular exercise    Migraine headaches  Continue treatment with Excedrin Migraine/Tylenol sinus as needed    History of colon polyps  Next screening colonoscopy due in 9/2027 (Dr. Osorio)    Obstructive sleep apnea  Continue mouthpiece use    Right sided hearing loss, possible eustachian tube dysfunction  Follow-up with [facility] as scheduled    Follow-up  1.  Office visit in April 2025  (Fasting lab work ordered to complete prior to visit)  2.  Medicare any wellness visit in 1 year, on/after 10/8/2025    Rajat Lomeli MD

## 2024-10-07 NOTE — PATIENT INSTRUCTIONS
Medicare annual wellness visit (subsequent)  Regular exercise with goal of 120-150 minutes/week recommended  Well-balanced diet rich in fruits, vegetables, fiber, lean protein recommended  Continued routine follow-up with ophthalmology and dentistry recommended  High-dose influenza vaccine given today.  COVID-19 vaccine to be received at pharmacy.  Upcoming GYN exam scheduled later this month for breast/pelvic exam  Annual mammogram will be due in 9/2025 (order is in place)    Essential hypertension  Continue losartan and hydrochlorothiazide at current dosing  Monitor blood pressure twice a week for 2 weeks and send results via [employer]  Continued low-salt diet and regular exercise recommended    Hyperlipidemia (at goal)  Continue rosuvastatin 20 mg daily  Continue regular exercise and healthy low-fat/cholesterol, high-fiber diet    Pre-diabetes (with normal fasting blood sugar and hemoglobin A1c at 5.4%)  Continue metformin 500 mg twice a day  Continue healthy lifestyle with proper diet and regular exercise    Migraine headaches  Continue treatment with Excedrin Migraine/Tylenol sinus as needed    History of colon polyps  Next screening colonoscopy due in 9/2027 (Dr. Osorio)    Obstructive sleep apnea  Continue mouthpiece use    Follow-up  1.  Office visit in April 2025  (Fasting lab work ordered to complete prior to visit)  2.  Medicare any wellness visit in 1 year, on/after 10/8/2025

## 2024-10-14 ENCOUNTER — APPOINTMENT (OUTPATIENT)
Dept: OBSTETRICS AND GYNECOLOGY | Facility: CLINIC | Age: 69
End: 2024-10-14
Payer: MEDICARE

## 2024-10-22 ENCOUNTER — APPOINTMENT (OUTPATIENT)
Dept: OBSTETRICS AND GYNECOLOGY | Facility: CLINIC | Age: 69
End: 2024-10-22
Payer: MEDICARE

## 2024-10-22 VITALS
HEIGHT: 63 IN | SYSTOLIC BLOOD PRESSURE: 136 MMHG | BODY MASS INDEX: 30.12 KG/M2 | DIASTOLIC BLOOD PRESSURE: 88 MMHG | WEIGHT: 170 LBS

## 2024-10-22 DIAGNOSIS — Z01.419 ENCOUNTER FOR WELL WOMAN EXAM WITH ROUTINE GYNECOLOGICAL EXAM: Primary | ICD-10-CM

## 2024-10-22 DIAGNOSIS — Z12.39 ENCOUNTER FOR SCREENING FOR MALIGNANT NEOPLASM OF BREAST, UNSPECIFIED SCREENING MODALITY: ICD-10-CM

## 2024-10-22 PROCEDURE — 3008F BODY MASS INDEX DOCD: CPT | Performed by: ADVANCED PRACTICE MIDWIFE

## 2024-10-22 PROCEDURE — 1157F ADVNC CARE PLAN IN RCRD: CPT | Performed by: ADVANCED PRACTICE MIDWIFE

## 2024-10-22 PROCEDURE — 1159F MED LIST DOCD IN RCRD: CPT | Performed by: ADVANCED PRACTICE MIDWIFE

## 2024-10-22 PROCEDURE — G0101 CA SCREEN;PELVIC/BREAST EXAM: HCPCS | Performed by: ADVANCED PRACTICE MIDWIFE

## 2024-10-22 PROCEDURE — 3079F DIAST BP 80-89 MM HG: CPT | Performed by: ADVANCED PRACTICE MIDWIFE

## 2024-10-22 PROCEDURE — 3075F SYST BP GE 130 - 139MM HG: CPT | Performed by: ADVANCED PRACTICE MIDWIFE

## 2024-10-22 ASSESSMENT — ENCOUNTER SYMPTOMS
ENDOCRINE NEGATIVE: 0
CONSTITUTIONAL NEGATIVE: 0
ALLERGIC/IMMUNOLOGIC NEGATIVE: 0
HEMATOLOGIC/LYMPHATIC NEGATIVE: 0
RESPIRATORY NEGATIVE: 0
EYES NEGATIVE: 0
CARDIOVASCULAR NEGATIVE: 0
NEUROLOGICAL NEGATIVE: 0
PSYCHIATRIC NEGATIVE: 0
GASTROINTESTINAL NEGATIVE: 0
MUSCULOSKELETAL NEGATIVE: 0

## 2024-10-22 NOTE — PROGRESS NOTES
"Subjective   Pilar Blake is a 69 y.o. female who is here for Annual Exam (Last PAP= 10/30/2017 negative //Last MAMMO= 2024 //Last Colonoscopy=  due in  //Mane GLASS MA, II/).     Concerns today:  None- mamm done 2024  Last pap 10/2017 - never any abnormal   Colonoscopy  next in   Dexa done this summer     Patient is postmenopausal.   Experiencing menopause symptoms? None  The patient is not taking hormone therapy.   Any Contraindications to HT:  No     Sexual Activity: not sexually active  Pain with intercourse? No   Loss of desire? No   Able to have an orgasm? No       Last pap:  age 62 - no abnormals  History of abnormal Pap smear: no  Family history of uterine or ovarian cancer: no    Last mammogram: 2024  History of abnormal mammogram: no  Family history of breast cancer: no  OB History    Para Term  AB Living   5 5 5 0 0 0   SAB IAB Ectopic Multiple Live Births   0 0 0 0 0      Objective   /88   Ht 1.6 m (5' 3\")   Wt 77.1 kg (170 lb)    Physical Exam  Vitals reviewed.   Constitutional:       General: She is not in acute distress.     Appearance: Normal appearance. She is well-developed and well-groomed.   Neck:      Thyroid: No thyroid mass, thyromegaly or thyroid tenderness.   Cardiovascular:      Rate and Rhythm: Normal rate and regular rhythm. No extrasystoles are present.  Pulmonary:      Effort: Pulmonary effort is normal.      Breath sounds: Normal breath sounds.   Chest:   Breasts:     Right: Normal. No inverted nipple, mass, nipple discharge, skin change or tenderness.      Left: Normal. No inverted nipple, mass, nipple discharge, skin change or tenderness.   Abdominal:      General: Abdomen is flat. There is no distension.      Palpations: Abdomen is soft.      Tenderness: There is no abdominal tenderness. There is no right CVA tenderness or left CVA tenderness.   Genitourinary:     Labia:         Right: No rash, tenderness, lesion or injury.         " Left: No rash, tenderness, lesion or injury.       Urethra: No urethral pain or urethral lesion.   Skin:     General: Skin is warm and dry.   Neurological:      Mental Status: She is alert.   Psychiatric:         Mood and Affect: Mood and affect normal.         Speech: Speech normal.         Behavior: Behavior normal. Behavior is cooperative.          Assessment/Plan   Problem List Items Addressed This Visit    None  Visit Diagnoses         Codes    Encounter for well woman exam with routine gynecological exam    -  Primary Z01.419    Encounter for screening for malignant neoplasm of breast, unspecified screening modality     Z12.39          No follow-ups on file.  Bella Barry, MARY-OSVALDOM

## 2024-10-28 DIAGNOSIS — G43.109 MIGRAINE WITH AURA AND WITHOUT STATUS MIGRAINOSUS, NOT INTRACTABLE: ICD-10-CM

## 2024-10-28 RX ORDER — TOPIRAMATE 50 MG/1
100 TABLET, FILM COATED ORAL NIGHTLY
Qty: 180 TABLET | Refills: 1 | Status: SHIPPED | OUTPATIENT
Start: 2024-10-28

## 2024-11-25 ENCOUNTER — APPOINTMENT (OUTPATIENT)
Dept: OTOLARYNGOLOGY | Facility: CLINIC | Age: 69
End: 2024-11-25
Payer: MEDICARE

## 2024-11-25 ENCOUNTER — APPOINTMENT (OUTPATIENT)
Dept: AUDIOLOGY | Facility: CLINIC | Age: 69
End: 2024-11-25
Payer: MEDICARE

## 2025-01-23 DIAGNOSIS — R73.03 PREDIABETES: ICD-10-CM

## 2025-01-23 RX ORDER — METFORMIN HYDROCHLORIDE 500 MG/1
500 TABLET ORAL
Qty: 180 TABLET | Refills: 3 | Status: SHIPPED | OUTPATIENT
Start: 2025-01-23

## 2025-04-02 ENCOUNTER — LAB (OUTPATIENT)
Dept: LAB | Facility: HOSPITAL | Age: 70
End: 2025-04-02
Payer: MEDICARE

## 2025-04-02 DIAGNOSIS — R73.03 PREDIABETES: Primary | ICD-10-CM

## 2025-04-02 LAB
ANION GAP SERPL CALC-SCNC: 11 MMOL/L (ref 10–20)
BUN SERPL-MCNC: 21 MG/DL (ref 6–23)
CALCIUM SERPL-MCNC: 10.2 MG/DL (ref 8.6–10.6)
CHLORIDE SERPL-SCNC: 109 MMOL/L (ref 98–107)
CO2 SERPL-SCNC: 28 MMOL/L (ref 21–32)
CREAT SERPL-MCNC: 0.9 MG/DL (ref 0.5–1.05)
EGFRCR SERPLBLD CKD-EPI 2021: 69 ML/MIN/1.73M*2
EST. AVERAGE GLUCOSE BLD GHB EST-MCNC: 120 MG/DL
GLUCOSE SERPL-MCNC: 106 MG/DL (ref 74–99)
HBA1C MFR BLD: 5.8 %
POTASSIUM SERPL-SCNC: 4.5 MMOL/L (ref 3.5–5.3)
SODIUM SERPL-SCNC: 143 MMOL/L (ref 136–145)

## 2025-04-02 PROCEDURE — 80048 BASIC METABOLIC PNL TOTAL CA: CPT

## 2025-04-02 PROCEDURE — 83036 HEMOGLOBIN GLYCOSYLATED A1C: CPT

## 2025-04-02 PROCEDURE — 36415 COLL VENOUS BLD VENIPUNCTURE: CPT

## 2025-04-14 ENCOUNTER — APPOINTMENT (OUTPATIENT)
Dept: PRIMARY CARE | Facility: CLINIC | Age: 70
End: 2025-04-14
Payer: MEDICARE

## 2025-04-14 VITALS
BODY MASS INDEX: 30.47 KG/M2 | DIASTOLIC BLOOD PRESSURE: 82 MMHG | OXYGEN SATURATION: 99 % | HEART RATE: 69 BPM | SYSTOLIC BLOOD PRESSURE: 140 MMHG | WEIGHT: 172 LBS

## 2025-04-14 DIAGNOSIS — K21.9 GASTROESOPHAGEAL REFLUX DISEASE, UNSPECIFIED WHETHER ESOPHAGITIS PRESENT: ICD-10-CM

## 2025-04-14 DIAGNOSIS — G43.109 MIGRAINE WITH AURA AND WITHOUT STATUS MIGRAINOSUS, NOT INTRACTABLE: ICD-10-CM

## 2025-04-14 DIAGNOSIS — R20.2 PARESTHESIA OF BOTH LEGS: ICD-10-CM

## 2025-04-14 DIAGNOSIS — G47.33 OBSTRUCTIVE SLEEP APNEA SYNDROME: ICD-10-CM

## 2025-04-14 DIAGNOSIS — I10 PRIMARY HYPERTENSION: Primary | ICD-10-CM

## 2025-04-14 DIAGNOSIS — R73.03 PREDIABETES: ICD-10-CM

## 2025-04-14 DIAGNOSIS — F51.01 PRIMARY INSOMNIA: ICD-10-CM

## 2025-04-14 DIAGNOSIS — E78.2 MIXED HYPERLIPIDEMIA: ICD-10-CM

## 2025-04-14 PROCEDURE — 3079F DIAST BP 80-89 MM HG: CPT | Performed by: INTERNAL MEDICINE

## 2025-04-14 PROCEDURE — 99214 OFFICE O/P EST MOD 30 MIN: CPT | Performed by: INTERNAL MEDICINE

## 2025-04-14 PROCEDURE — 1036F TOBACCO NON-USER: CPT | Performed by: INTERNAL MEDICINE

## 2025-04-14 PROCEDURE — G2211 COMPLEX E/M VISIT ADD ON: HCPCS | Performed by: INTERNAL MEDICINE

## 2025-04-14 PROCEDURE — 1159F MED LIST DOCD IN RCRD: CPT | Performed by: INTERNAL MEDICINE

## 2025-04-14 PROCEDURE — 3077F SYST BP >= 140 MM HG: CPT | Performed by: INTERNAL MEDICINE

## 2025-04-14 PROCEDURE — 1157F ADVNC CARE PLAN IN RCRD: CPT | Performed by: INTERNAL MEDICINE

## 2025-04-14 RX ORDER — TOPIRAMATE 50 MG/1
50 TABLET, FILM COATED ORAL NIGHTLY
Start: 2025-04-14

## 2025-04-14 RX ORDER — ACETAMINOPHEN, ASPIRIN, AND CAFFEINE 250; 250; 65 MG/1; MG/1; MG/1
TABLET, FILM COATED ORAL
Start: 2025-04-14

## 2025-04-14 ASSESSMENT — ENCOUNTER SYMPTOMS
ABDOMINAL PAIN: 0
DIZZINESS: 0
CONSTIPATION: 0
DYSURIA: 0
HEADACHES: 0
DIARRHEA: 0
SHORTNESS OF BREATH: 0
FATIGUE: 0
WHEEZING: 0
COUGH: 0
PALPITATIONS: 0

## 2025-04-14 NOTE — PROGRESS NOTES
Subjective   Patient ID: Pilar Blake is a 70 y.o. female who presents for Follow-up.    Essential hypertension  Single home blood pressure reading provided from February = 118/74.  Exercising 3 days a week.  No chest pain, shortness of breath, palpitations.  Blood pressure elevated in office today, compliant with both losartan and hydrochlorothiazide.    Hyperlipidemia  Compliant with atorvastatin 20 mg daily  Patient with questions regarding lipoprotein (a) measurement     Pre-diabetes  Weight up 6 pounds, BMI 30.4.  Hemoglobin A1c 5.8%, up from 5.4% in October  Exercise is slightly decreased.  Also, some dietary indiscretion.  Remains on same dose of metformin.     Migraine headaches  No change in headache pattern, responsive to Excedrin Migraine/Tylenol sinus as needed.  Has not used Maxalt lately.    Obstructive sleep apnea  Using mouthpiece at night     Tramaine maintenance  COVID-19 booster vaccine eligible, discussed  Screening mammogram is scheduled in September           Review of Systems   Constitutional:  Negative for fatigue.   Eyes:  Negative for visual disturbance.   Respiratory:  Negative for cough, shortness of breath and wheezing.    Cardiovascular:  Negative for chest pain and palpitations.   Gastrointestinal:  Negative for abdominal pain, constipation and diarrhea.   Genitourinary:  Negative for dysuria and urgency.   Neurological:  Negative for dizziness and headaches.       Objective   /82 (BP Location: Left arm, Patient Position: Sitting, BP Cuff Size: Adult)   Pulse 69   Wt 78 kg (172 lb)   SpO2 99%   BMI 30.47 kg/m²     Physical Exam  Vitals reviewed.   Constitutional:       Appearance: Normal appearance.   HENT:      Head: Normocephalic.      Mouth/Throat:      Mouth: Mucous membranes are moist.   Eyes:      Conjunctiva/sclera: Conjunctivae normal.   Cardiovascular:      Rate and Rhythm: Normal rate and regular rhythm.      Heart sounds: No murmur heard.  Pulmonary:      Effort:  Pulmonary effort is normal.      Breath sounds: Normal breath sounds. No wheezing or rales.   Abdominal:      General: Bowel sounds are normal.      Palpations: Abdomen is soft.      Tenderness: There is no abdominal tenderness.   Musculoskeletal:         General: Normal range of motion.      Right lower leg: No edema.      Left lower leg: No edema.   Skin:     General: Skin is warm.      Findings: No rash.   Neurological:      General: No focal deficit present.      Mental Status: She is alert and oriented to person, place, and time.   Psychiatric:         Mood and Affect: Mood normal.         Assessment/Plan     Essential hypertension  (Elevated office blood pressure, normal home blood pressure)  Continue losartan 50 mg daily and hydrochlorothiazide 12.5 mg daily  Monitor blood pressure twice a week for 3 weeks and send results via [email or web address]     Hyperlipidemia  Continue rosuvastatin 20 mg daily  Continue regular exercise and healthy low-fat/cholesterol, high-fiber diet     Pre-diabetes (hemoglobin A1c 5.8%)   Continue metformin 500 mg twice a day  Continue healthy lifestyle with proper diet and regular exercise  Modest weight loss of 5-10 pounds recommended     Migraine headaches  Continue treatment with Excedrin Migraine/Tylenol sinus as needed    Obstructive sleep apnea  Continue mouthpiece use     Tramaine maintenance  COVID-19 booster vaccine eligible  Screening mammogram is scheduled in September    Follow-up  Medicare annual wellness visit/office visit on 10/13/2025  (Fasting lab work be ordered complete 3 to 7 days prior)    Rajat Lomeli MD

## 2025-04-14 NOTE — PATIENT INSTRUCTIONS
Essential hypertension  (Elevated office blood pressure, normal home blood pressure)  Continue losartan 50 mg daily and hydrochlorothiazide 12.5 mg daily  Monitor blood pressure twice a week for 3 weeks and send results via [email or web address]     Hyperlipidemia  Continue rosuvastatin 20 mg daily  Continue regular exercise and healthy low-fat/cholesterol, high-fiber diet     Pre-diabetes (hemoglobin A1c 5.8%)   Continue metformin 500 mg twice a day  Continue healthy lifestyle with proper diet and regular exercise  Modest weight loss of 5-10 pounds recommended     Migraine headaches  Continue treatment with Excedrin Migraine/Tylenol sinus as needed    Obstructive sleep apnea  Continue mouthpiece use     Tramaine maintenance  COVID-19 booster vaccine eligible  Screening mammogram is scheduled in September    Follow-up  Medicare annual wellness visit/office visit on 10/13/2025  (Fasting lab work be ordered complete 3 to 7 days prior)

## 2025-04-15 DIAGNOSIS — I10 PRIMARY HYPERTENSION: Primary | ICD-10-CM

## 2025-04-15 DIAGNOSIS — I10 BENIGN ESSENTIAL HYPERTENSION: ICD-10-CM

## 2025-04-15 RX ORDER — LOSARTAN POTASSIUM 50 MG/1
50 TABLET ORAL DAILY
Qty: 90 TABLET | Refills: 3 | Status: SHIPPED | OUTPATIENT
Start: 2025-04-15

## 2025-04-22 DIAGNOSIS — R20.2 PARESTHESIA OF BOTH LEGS: ICD-10-CM

## 2025-04-22 DIAGNOSIS — G43.109 MIGRAINE WITH AURA AND WITHOUT STATUS MIGRAINOSUS, NOT INTRACTABLE: ICD-10-CM

## 2025-04-22 DIAGNOSIS — I10 PRIMARY HYPERTENSION: ICD-10-CM

## 2025-04-22 RX ORDER — HYDROCHLOROTHIAZIDE 12.5 MG/1
12.5 TABLET ORAL DAILY
Qty: 90 TABLET | Refills: 3 | Status: SHIPPED | OUTPATIENT
Start: 2025-04-22

## 2025-04-22 RX ORDER — TOPIRAMATE 50 MG/1
TABLET, FILM COATED ORAL NIGHTLY
Qty: 180 TABLET | Refills: 1 | OUTPATIENT
Start: 2025-04-22

## 2025-04-22 NOTE — TELEPHONE ENCOUNTER
Patient and I spoke.  She is actually going to try to taper off the topiramate, currently taking 50 mg at bedtime.  She has been advised to decrease to 25 mg at bedtime for 2 weeks, then every other day at bedtime for 1 to 2 weeks, then stop    Medication originally prescribed for bilateral shin discomfort several years ago.    Rajat Lomeli MD

## 2025-04-29 DIAGNOSIS — R20.2 PARESTHESIA OF BOTH LEGS: ICD-10-CM

## 2025-04-29 DIAGNOSIS — G43.109 MIGRAINE WITH AURA AND WITHOUT STATUS MIGRAINOSUS, NOT INTRACTABLE: ICD-10-CM

## 2025-04-29 DIAGNOSIS — E78.2 MIXED HYPERLIPIDEMIA: ICD-10-CM

## 2025-04-29 RX ORDER — TOPIRAMATE 50 MG/1
TABLET, FILM COATED ORAL NIGHTLY
Qty: 180 TABLET | Refills: 3 | OUTPATIENT
Start: 2025-04-29

## 2025-04-29 RX ORDER — ROSUVASTATIN CALCIUM 20 MG/1
20 TABLET, COATED ORAL DAILY
Qty: 90 TABLET | Refills: 3 | Status: SHIPPED | OUTPATIENT
Start: 2025-04-29

## 2025-05-01 DIAGNOSIS — R20.2 PARESTHESIA OF BOTH LEGS: ICD-10-CM

## 2025-05-01 DIAGNOSIS — G43.109 MIGRAINE WITH AURA AND WITHOUT STATUS MIGRAINOSUS, NOT INTRACTABLE: ICD-10-CM

## 2025-05-01 RX ORDER — TOPIRAMATE 50 MG/1
TABLET, FILM COATED ORAL NIGHTLY
Qty: 180 TABLET | Refills: 1 | OUTPATIENT
Start: 2025-05-01

## 2025-05-01 NOTE — TELEPHONE ENCOUNTER
Patient and I had a conversation in the last 2 weeks and she is planning to taper off topiramate.  Please call her to see if she in fact needs a refill.  If so, I would likely give her a smaller dose/quantity.  Thanks and let me know.  Rajat Lomeli MD

## 2025-05-17 ENCOUNTER — OFFICE VISIT (OUTPATIENT)
Dept: URGENT CARE | Age: 70
End: 2025-05-17
Payer: MEDICARE

## 2025-05-17 VITALS
SYSTOLIC BLOOD PRESSURE: 156 MMHG | WEIGHT: 170 LBS | HEART RATE: 64 BPM | BODY MASS INDEX: 30.12 KG/M2 | DIASTOLIC BLOOD PRESSURE: 91 MMHG | TEMPERATURE: 98.1 F | OXYGEN SATURATION: 98 % | HEIGHT: 63 IN | RESPIRATION RATE: 19 BRPM

## 2025-05-17 DIAGNOSIS — H92.02 EAR PAIN, LEFT: Primary | ICD-10-CM

## 2025-05-17 RX ORDER — NEOMYCIN SULFATE, POLYMYXIN B SULFATE, HYDROCORTISONE 3.5; 10000; 1 MG/ML; [USP'U]/ML; MG/ML
3 SOLUTION/ DROPS AURICULAR (OTIC) 4 TIMES DAILY
Qty: 6 ML | Refills: 0 | Status: SHIPPED | OUTPATIENT
Start: 2025-05-17 | End: 2025-05-27

## 2025-05-17 ASSESSMENT — ENCOUNTER SYMPTOMS
FATIGUE: 0
SHORTNESS OF BREATH: 0
HEADACHES: 0
CHILLS: 0
COUGH: 0
FEVER: 0

## 2025-05-17 NOTE — PROGRESS NOTES
"Subjective   Patient ID: Pilar Blake is a 70 y.o. female. They present today with a chief complaint of Illness (Ear pain - Entered by patient) and Earache (Ear ache for about two days in the left side. Doesn't pop or feel clogged).    History of Present Illness  Patient is a 70-year-old female with past medical history of hypertension and high cholesterol.  Patient is complaining of left ear pain x 2 days.  Patient denies sick contacts she has been taking over-the-counter medications with some effect.      History provided by:  Patient   used: No    Illness  Associated symptoms: congestion and ear pain    Associated symptoms: no chest pain, no cough, no fatigue, no fever, no headaches and no shortness of breath    Earache   Pertinent negatives include no coughing or headaches.       Past Medical History  Allergies as of 05/17/2025 - Reviewed 05/17/2025   Allergen Reaction Noted    Lisinopril Cough 04/22/2024       Prescriptions Prior to Admission[1]     Medical History[2]    Surgical History[3]     reports that she quit smoking about 39 years ago. Her smoking use included cigarettes. She started smoking about 54 years ago. She has a 3.8 pack-year smoking history. She has never used smokeless tobacco. She reports that she does not currently use alcohol. She reports that she does not use drugs.    Review of Systems  Review of Systems   Constitutional:  Negative for chills, fatigue and fever.   HENT:  Positive for congestion and ear pain.    Respiratory:  Negative for cough and shortness of breath.    Cardiovascular:  Negative for chest pain.   Neurological:  Negative for headaches.                                  Objective    Vitals:    05/17/25 1038   BP: (!) 156/91   Pulse: 64   Resp: 19   Temp: 36.7 °C (98.1 °F)   SpO2: 98%   Weight: 77.1 kg (170 lb)   Height: 1.6 m (5' 3\")     No LMP recorded. Patient is postmenopausal.    Physical Exam  Constitutional:       General: She is not in acute " distress.     Appearance: She is not ill-appearing or toxic-appearing.   HENT:      Head: Normocephalic and atraumatic.      Right Ear: Tympanic membrane, ear canal and external ear normal. There is no impacted cerumen.      Left Ear: Tympanic membrane, ear canal and external ear normal. There is no impacted cerumen.      Nose: Congestion present.      Mouth/Throat:      Mouth: Mucous membranes are moist.      Pharynx: No oropharyngeal exudate or posterior oropharyngeal erythema.   Eyes:      Extraocular Movements: Extraocular movements intact.   Cardiovascular:      Rate and Rhythm: Normal rate.   Pulmonary:      Effort: Pulmonary effort is normal.   Musculoskeletal:         General: No swelling or deformity.      Cervical back: Normal range of motion and neck supple. No rigidity or tenderness.   Lymphadenopathy:      Cervical: No cervical adenopathy.   Skin:     General: Skin is warm and dry.   Neurological:      Mental Status: She is alert and oriented to person, place, and time.         Procedures    Point of Care Test & Imaging Results from this visit  No results found for this visit on 05/17/25.   Imaging  No results found.    Cardiology, Vascular, and Other Imaging  No other imaging results found for the past 2 days      Diagnostic study results (if any) were reviewed by Dayanara Carson MD.    Assessment/Plan   Allergies, medications, history, and pertinent labs/EKGs/Imaging reviewed by Dayanara Carson MD.     Medical Decision Making    Patient was seen and examined.  Left ear normal exam.  Patient was started on Cortisporin drops and encouraged allergy medications.  Orders and Diagnoses  There are no diagnoses linked to this encounter.    Medical Admin Record      Patient disposition: Home    Electronically signed by Dayanara Carson MD  11:04 AM           [1] (Not in a hospital admission)  [2]   Past Medical History:  Diagnosis Date    Personal history of other diseases of the nervous system and sense  organs 2014    History of tension headache    Personal history of other infectious and parasitic diseases 2014    History of herpes zoster   [3]   Past Surgical History:  Procedure Laterality Date     SECTION, CLASSIC       Section x3    COLONOSCOPY  2015    Complete Colonoscopy    FOOT SURGERY Bilateral     Multiple foot surgeries with podiatry Dr. Benny Ferrara    OOPHORECTOMY      Oophorectomy (at time of )    PARTIAL KNEE ARTHROPLASTY Left     Dr. Veloz [facility]    REFRACTIVE SURGERY      Corneal LASIK    TOTAL KNEE ARTHROPLASTY Right     TUBAL LIGATION      Tubal Ligation (at time of )    UVULOPALATOPLASTY      Remote for snoring

## 2025-05-19 PROBLEM — G50.0 TRIGEMINAL NEURALGIA OF LEFT SIDE OF FACE: Status: ACTIVE | Noted: 2025-05-19

## 2025-05-20 ENCOUNTER — E-CONSULT (OUTPATIENT)
Dept: NEUROLOGY | Facility: CLINIC | Age: 70
End: 2025-05-20
Payer: MEDICARE

## 2025-05-20 DIAGNOSIS — G50.0 TRIGEMINAL NEURALGIA: Primary | ICD-10-CM

## 2025-05-20 PROCEDURE — 99451 NTRPROF PH1/NTRNET/EHR 5/>: CPT | Performed by: STUDENT IN AN ORGANIZED HEALTH CARE EDUCATION/TRAINING PROGRAM

## 2025-05-20 NOTE — PROGRESS NOTES
Headache E-Consult  Date:  25     Reason for E-Consult:  Facial Pain  Requesting Provider:  Dr. Rajat Lomeli    Per Chart Review:    Ms. Pilar Blake is a 70 y.o. female, with facial pain    Per referring provider, patient has been having 30 days of facial pain per month that are suspected to have a trigeminal neuralgia phenotype.  Has not been trialed on any medications thus far.    Medical History[1]  Surgical History[2]  Current Medications[3]    Recommendations:  - Would start oxcarbazepine 150mg BID for prevention. In no improvement after 2 weeks, would uptitrate to 300mg BID (tends to be better tolerated than carbamazepine)  - would need serum sodium checks if started on oxcarbazepine to ensure that hyponatremia does not develop  - MRI brain with and without contrast as an MRA head FIESTA sequence to evaluate for vessel abutment of trigeminal nerve  - If pain is not controlled with these interventions, can [id number] or consider referral for an in-person evaluation.     Formal Referral Recommended:  not urgently needed at this time    eConsult Time: 5 minutes, time spent in interprofessional consultation including review of pertinent medical records, laboratory studies, medication profile and pathology specimens.  Greater than 50% of the total service was spent in medical consultative verbal or internet discussion.    Please do not respond directly to this E-Consult message as any comments will then show up as a new E-Consult and won't necessarily be seen be me if I'm not on call that day.  If you have questions staff message me directly.    Jaya Vasquez DO             [1]   Past Medical History:  Diagnosis Date    Personal history of other diseases of the nervous system and sense organs 2014    History of tension headache    Personal history of other infectious and parasitic diseases 2014    History of herpes zoster   [2]   Past Surgical History:  Procedure Laterality Date      SECTION, CLASSIC       Section x3    COLONOSCOPY  2015    Complete Colonoscopy    FOOT SURGERY Bilateral     Multiple foot surgeries with podiatry Dr. Benny Ferrara    OOPHORECTOMY      Oophorectomy (at time of )    PARTIAL KNEE ARTHROPLASTY Left     Dr. Veloz [facility]    REFRACTIVE SURGERY      Corneal LASIK    TOTAL KNEE ARTHROPLASTY Right     TUBAL LIGATION      Tubal Ligation (at time of )    UVULOPALATOPLASTY      Remote for snoring   [3]   Current Outpatient Medications:     rosuvastatin (Crestor) 20 mg tablet, TAKE 1 TABLET BY MOUTH ONCE DAILY, Disp: 90 tablet, Rfl: 3    aspirin-acetaminophen-caffeine (Excedrin Migraine) 250-250-65 mg tablet, Take 1 by mouth daily as needed for migraine headache, Disp: , Rfl:     BIOTIN ORAL, Take 1 capsule by mouth once daily., Disp: , Rfl:     CALCIUM CARBONATE-VITAMIN D3 ORAL, Take 1 tablet by mouth once daily., Disp: , Rfl:     carBAMazepine XR (TEGretol XR) 100 mg 12 hr tablet, Take 1 tablet (100 mg) by mouth 2 times a day., Disp: 60 tablet, Rfl: 2    diphenhydrAMINE-acetaminophen (Tylenol PM Extra Strength)  mg per tablet, Take 1 tablet by mouth as needed at bedtime., Disp: , Rfl:     fluticasone (Flonase) 50 mcg/actuation nasal spray, Administer 2 sprays into each nostril once daily., Disp: 16 g, Rfl: 11    hydroCHLOROthiazide (Microzide) 12.5 mg tablet, TAKE 1 TABLET BY MOUTH ONCE DAILY, Disp: 90 tablet, Rfl: 3    losartan (Cozaar) 50 mg tablet, TAKE 1 TABLET BY MOUTH EVERY DAY, Disp: 90 tablet, Rfl: 3    metFORMIN (Glucophage) 500 mg tablet, Take 1 tablet (500 mg) by mouth 2 times a day with meals., Disp: 180 tablet, Rfl: 3    multivitamin tablet, Take 1 tablet by mouth once daily., Disp: , Rfl:     neomycin-polymyxin-HC (Cortisporin) otic solution, Administer 3 drops into the left ear 4 times a day for 10 days., Disp: 6 mL, Rfl: 0    rizatriptan MLT (Maxalt-MLT) 10 mg disintegrating tablet, Dissolve 1 tablet (10 mg) in the  mouth 1 time if needed., Disp: , Rfl:     topiramate (Topamax) 50 mg tablet, Take 1 tablet (50 mg) by mouth once daily at bedtime., Disp: , Rfl:     traMADol (Ultram) 50 mg tablet, Take 1 tablet (50 mg) by mouth every 8 hours if needed for severe pain (7 - 10) for up to 7 days., Disp: 21 tablet, Rfl: 0    vit A,C and E-lutein-minerals (Ocuvite with Lutein) 300 mcg-200 mg-27 mg-2 mg tablet, Take 1 tablet by mouth once daily., Disp: , Rfl:

## 2025-06-04 ENCOUNTER — HOSPITAL ENCOUNTER (OUTPATIENT)
Dept: RADIOLOGY | Facility: HOSPITAL | Age: 70
Discharge: HOME | End: 2025-06-04
Payer: MEDICARE

## 2025-06-04 ENCOUNTER — APPOINTMENT (OUTPATIENT)
Dept: RADIOLOGY | Facility: HOSPITAL | Age: 70
End: 2025-06-04
Payer: MEDICARE

## 2025-06-04 DIAGNOSIS — G50.0 TRIGEMINAL NEURALGIA OF LEFT SIDE OF FACE: ICD-10-CM

## 2025-06-04 PROCEDURE — 2550000001 HC RX 255 CONTRASTS: Performed by: INTERNAL MEDICINE

## 2025-06-04 PROCEDURE — 70553 MRI BRAIN STEM W/O & W/DYE: CPT

## 2025-06-04 PROCEDURE — A9575 INJ GADOTERATE MEGLUMI 0.1ML: HCPCS | Performed by: INTERNAL MEDICINE

## 2025-06-04 PROCEDURE — 70546 MR ANGIOGRAPH HEAD W/O&W/DYE: CPT

## 2025-06-04 RX ORDER — GADOTERATE MEGLUMINE 376.9 MG/ML
15 INJECTION INTRAVENOUS
Status: COMPLETED | OUTPATIENT
Start: 2025-06-04 | End: 2025-06-04

## 2025-06-04 RX ADMIN — GADOTERATE MEGLUMINE 15 ML: 376.9 INJECTION INTRAVENOUS at 17:46

## 2025-10-13 ENCOUNTER — APPOINTMENT (OUTPATIENT)
Dept: PRIMARY CARE | Facility: CLINIC | Age: 70
End: 2025-10-13
Payer: MEDICARE